# Patient Record
Sex: FEMALE | Race: WHITE | NOT HISPANIC OR LATINO | Employment: UNEMPLOYED | URBAN - METROPOLITAN AREA
[De-identification: names, ages, dates, MRNs, and addresses within clinical notes are randomized per-mention and may not be internally consistent; named-entity substitution may affect disease eponyms.]

---

## 2017-01-27 LAB
EXTERNAL ABO GROUPING: NORMAL
EXTERNAL ANTIBODY SCREEN: NEGATIVE
EXTERNAL CHLAMYDIA SCREEN: NORMAL
EXTERNAL GONORRHEA SCREEN: NORMAL
EXTERNAL HEPATITIS B SURFACE ANTIGEN: NEGATIVE
EXTERNAL RH FACTOR: POSITIVE
EXTERNAL RUBELLA QUALITATIVE: NORMAL
EXTERNAL SYPHILIS RPR SCREEN: NORMAL
HIV1 AB SPEC QL IA.RAPID: NEGATIVE

## 2017-06-16 ENCOUNTER — TRANSCRIBE ORDERS (OUTPATIENT)
Dept: OBSTETRICS AND GYNECOLOGY | Facility: HOSPITAL | Age: 22
End: 2017-06-16

## 2017-06-16 DIAGNOSIS — O36.5920: Primary | ICD-10-CM

## 2017-06-16 DIAGNOSIS — O30.002: Primary | ICD-10-CM

## 2017-06-16 LAB — EXTERNAL GTT 1 HOUR: 126

## 2017-06-22 ENCOUNTER — HOSPITAL ENCOUNTER (OUTPATIENT)
Dept: WOMENS IMAGING | Facility: HOSPITAL | Age: 22
Discharge: HOME | End: 2017-06-22
Admitting: OBSTETRICS & GYNECOLOGY

## 2017-06-22 ENCOUNTER — OFFICE VISIT (OUTPATIENT)
Dept: OBSTETRICS AND GYNECOLOGY | Facility: HOSPITAL | Age: 22
End: 2017-06-22

## 2017-06-22 VITALS
SYSTOLIC BLOOD PRESSURE: 122 MMHG | WEIGHT: 149.4 LBS | DIASTOLIC BLOOD PRESSURE: 76 MMHG | HEIGHT: 60 IN | BODY MASS INDEX: 29.33 KG/M2

## 2017-06-22 DIAGNOSIS — O30.002: ICD-10-CM

## 2017-06-22 DIAGNOSIS — O36.5920: ICD-10-CM

## 2017-06-22 DIAGNOSIS — O36.5932 IUGR (INTRAUTERINE GROWTH RESTRICTION) AFFECTING CARE OF MOTHER, THIRD TRIMESTER, FETUS 2: ICD-10-CM

## 2017-06-22 DIAGNOSIS — O30.039 TWIN MONOCHORIONIC DIAMNIOTIC PLACENTA, ANTEPARTUM: Primary | ICD-10-CM

## 2017-06-22 PROBLEM — O36.5990 IUGR (INTRAUTERINE GROWTH RESTRICTION) AFFECTING CARE OF MOTHER: Status: ACTIVE | Noted: 2017-06-22

## 2017-06-22 PROCEDURE — 76812 OB US DETAILED ADDL FETUS: CPT | Performed by: OBSTETRICS & GYNECOLOGY

## 2017-06-22 PROCEDURE — 76812 OB US DETAILED ADDL FETUS: CPT

## 2017-06-22 PROCEDURE — 99241 PR OFFICE CONSULTATION NEW/ESTAB PATIENT 15 MIN: CPT | Performed by: OBSTETRICS & GYNECOLOGY

## 2017-06-22 PROCEDURE — 76811 OB US DETAILED SNGL FETUS: CPT

## 2017-06-22 PROCEDURE — 76811 OB US DETAILED SNGL FETUS: CPT | Performed by: OBSTETRICS & GYNECOLOGY

## 2017-06-22 RX ORDER — PRENATAL WITH FERROUS FUM AND FOLIC ACID 3080; 920; 120; 400; 22; 1.84; 3; 20; 10; 1; 12; 200; 27; 25; 2 [IU]/1; [IU]/1; MG/1; [IU]/1; MG/1; MG/1; MG/1; MG/1; MG/1; MG/1; UG/1; MG/1; MG/1; MG/1; MG/1
1 TABLET ORAL DAILY
COMMUNITY

## 2017-06-22 NOTE — PROGRESS NOTES
Denies problems with pregnancy. States only one fetus noted by 6 week u/s, twin gestation identified during anatomy scan. States they are unsure about chorionicity. Discordant growth noted by u/s 6 days ago. Reports slightly decreased fetal movement since last pm; thinks it may be because she is nervous. Denies any bleeding, leaking fluid, cramping or contractions. Had not planned to know fetal genders.

## 2017-06-22 NOTE — PROGRESS NOTES
Documentation of the ultrasound findings, images, and interpretations as well as consultation notes will be available in the patient's ViewPoint report located in the chart review imaging tab in [place].

## 2017-06-29 ENCOUNTER — OFFICE VISIT (OUTPATIENT)
Dept: OBSTETRICS AND GYNECOLOGY | Facility: HOSPITAL | Age: 22
End: 2017-06-29

## 2017-06-29 ENCOUNTER — HOSPITAL ENCOUNTER (OUTPATIENT)
Dept: WOMENS IMAGING | Facility: HOSPITAL | Age: 22
Discharge: HOME | End: 2017-06-29
Attending: OBSTETRICS & GYNECOLOGY | Admitting: OBSTETRICS & GYNECOLOGY

## 2017-06-29 VITALS — WEIGHT: 151.2 LBS | SYSTOLIC BLOOD PRESSURE: 115 MMHG | DIASTOLIC BLOOD PRESSURE: 75 MMHG | BODY MASS INDEX: 30.03 KG/M2

## 2017-06-29 DIAGNOSIS — O36.5931 IUGR (INTRAUTERINE GROWTH RESTRICTION) AFFECTING CARE OF MOTHER, THIRD TRIMESTER, FETUS 1: Primary | ICD-10-CM

## 2017-06-29 DIAGNOSIS — O36.5932 IUGR (INTRAUTERINE GROWTH RESTRICTION) AFFECTING CARE OF MOTHER, THIRD TRIMESTER, FETUS 2: ICD-10-CM

## 2017-06-29 DIAGNOSIS — O30.039 TWIN MONOCHORIONIC DIAMNIOTIC PLACENTA, ANTEPARTUM: ICD-10-CM

## 2017-06-29 PROCEDURE — 76818 FETAL BIOPHYS PROFILE W/NST: CPT

## 2017-06-29 PROCEDURE — 76818 FETAL BIOPHYS PROFILE W/NST: CPT | Performed by: OBSTETRICS & GYNECOLOGY

## 2017-06-29 PROCEDURE — 76820 UMBILICAL ARTERY ECHO: CPT | Performed by: OBSTETRICS & GYNECOLOGY

## 2017-06-29 PROCEDURE — 76819 FETAL BIOPHYS PROFIL W/O NST: CPT

## 2017-06-29 NOTE — PROGRESS NOTES
Documentation of the ultrasound findings, images, and interpretations will be available in the patient's ViewPoint report located in the chart review imaging tab in [facility].

## 2017-07-06 ENCOUNTER — OFFICE VISIT (OUTPATIENT)
Dept: OBSTETRICS AND GYNECOLOGY | Facility: HOSPITAL | Age: 22
End: 2017-07-06

## 2017-07-06 ENCOUNTER — HOSPITAL ENCOUNTER (OUTPATIENT)
Dept: WOMENS IMAGING | Facility: HOSPITAL | Age: 22
Discharge: HOME | End: 2017-07-06
Attending: OBSTETRICS & GYNECOLOGY | Admitting: OBSTETRICS & GYNECOLOGY

## 2017-07-06 ENCOUNTER — HOSPITAL ENCOUNTER (INPATIENT)
Facility: HOSPITAL | Age: 22
LOS: 21 days | Discharge: HOME | End: 2017-07-27
Attending: OBSTETRICS & GYNECOLOGY | Admitting: OBSTETRICS & GYNECOLOGY

## 2017-07-06 VITALS — WEIGHT: 151.8 LBS | BODY MASS INDEX: 30.15 KG/M2 | SYSTOLIC BLOOD PRESSURE: 119 MMHG | DIASTOLIC BLOOD PRESSURE: 79 MMHG

## 2017-07-06 DIAGNOSIS — O36.5991 IUGR (INTRAUTERINE GROWTH RESTRICTION) AFFECTING CARE OF MOTHER, UNSPECIFIED TRIMESTER, FETUS 1: Primary | ICD-10-CM

## 2017-07-06 DIAGNOSIS — O30.039 TWIN MONOCHORIONIC DIAMNIOTIC PLACENTA, ANTEPARTUM: ICD-10-CM

## 2017-07-06 DIAGNOSIS — IMO0001 TWINS: ICD-10-CM

## 2017-07-06 DIAGNOSIS — O30.039 TWIN MONOCHORIONIC DIAMNIOTIC PLACENTA, ANTEPARTUM: Primary | ICD-10-CM

## 2017-07-06 DIAGNOSIS — O36.5931 IUGR (INTRAUTERINE GROWTH RESTRICTION) AFFECTING CARE OF MOTHER, THIRD TRIMESTER, FETUS 1: ICD-10-CM

## 2017-07-06 LAB
ABO GROUP BLD: NORMAL
ALP SERPL-CCNC: 381 U/L (ref 25–100)
ALT SERPL W P-5'-P-CCNC: 17 U/L (ref 7–40)
AST SERPL-CCNC: 25 U/L (ref 0–33)
BILIRUB SERPL-MCNC: 0.5 MG/DL (ref 0.3–1.2)
BLD GP AB SCN SERPL QL: NEGATIVE
CREAT BLD-MCNC: 0.4 MG/DL (ref 0.6–1.3)
DEPRECATED RDW RBC AUTO: 44.4 FL (ref 37–54)
DEPRECATED RDW RBC AUTO: 46.4 FL (ref 37–54)
ERYTHROCYTE [DISTWIDTH] IN BLOOD BY AUTOMATED COUNT: 13.5 % (ref 11.3–14.5)
ERYTHROCYTE [DISTWIDTH] IN BLOOD BY AUTOMATED COUNT: 13.6 % (ref 11.3–14.5)
HCT VFR BLD AUTO: 36.5 % (ref 34.5–44)
HCT VFR BLD AUTO: 37.4 % (ref 34.5–44)
HGB BLD-MCNC: 12 G/DL (ref 11.5–15.5)
HGB BLD-MCNC: 12.2 G/DL (ref 11.5–15.5)
LDH SERPL-CCNC: 251 U/L (ref 120–246)
MCH RBC QN AUTO: 29.8 PG (ref 27–31)
MCH RBC QN AUTO: 30.6 PG (ref 27–31)
MCHC RBC AUTO-ENTMCNC: 32.6 G/DL (ref 32–36)
MCHC RBC AUTO-ENTMCNC: 32.9 G/DL (ref 32–36)
MCV RBC AUTO: 91.4 FL (ref 80–99)
MCV RBC AUTO: 93.1 FL (ref 80–99)
PLATELET # BLD AUTO: 123 10*3/MM3 (ref 150–450)
PLATELET # BLD AUTO: 125 10*3/MM3 (ref 150–450)
PMV BLD AUTO: 11.6 FL (ref 6–12)
PMV BLD AUTO: 11.9 FL (ref 6–12)
RBC # BLD AUTO: 3.92 10*6/MM3 (ref 3.89–5.14)
RBC # BLD AUTO: 4.09 10*6/MM3 (ref 3.89–5.14)
RH BLD: POSITIVE
URATE SERPL-MCNC: 3.8 MG/DL (ref 3.1–7.8)
WBC NRBC COR # BLD: 11.59 10*3/MM3 (ref 3.5–10.8)
WBC NRBC COR # BLD: 11.85 10*3/MM3 (ref 3.5–10.8)

## 2017-07-06 PROCEDURE — 59025 FETAL NON-STRESS TEST: CPT

## 2017-07-06 PROCEDURE — 84075 ASSAY ALKALINE PHOSPHATASE: CPT | Performed by: OBSTETRICS & GYNECOLOGY

## 2017-07-06 PROCEDURE — 25010000002 BETAMETHASONE ACET & SOD PHOS PER 4 MG: Performed by: OBSTETRICS & GYNECOLOGY

## 2017-07-06 PROCEDURE — 82565 ASSAY OF CREATININE: CPT | Performed by: OBSTETRICS & GYNECOLOGY

## 2017-07-06 PROCEDURE — 86901 BLOOD TYPING SEROLOGIC RH(D): CPT | Performed by: OBSTETRICS & GYNECOLOGY

## 2017-07-06 PROCEDURE — 86900 BLOOD TYPING SEROLOGIC ABO: CPT | Performed by: OBSTETRICS & GYNECOLOGY

## 2017-07-06 PROCEDURE — 83615 LACTATE (LD) (LDH) ENZYME: CPT | Performed by: OBSTETRICS & GYNECOLOGY

## 2017-07-06 PROCEDURE — 86850 RBC ANTIBODY SCREEN: CPT | Performed by: OBSTETRICS & GYNECOLOGY

## 2017-07-06 PROCEDURE — 76819 FETAL BIOPHYS PROFIL W/O NST: CPT | Performed by: OBSTETRICS & GYNECOLOGY

## 2017-07-06 PROCEDURE — 99223 1ST HOSP IP/OBS HIGH 75: CPT | Performed by: OBSTETRICS & GYNECOLOGY

## 2017-07-06 PROCEDURE — 84450 TRANSFERASE (AST) (SGOT): CPT | Performed by: OBSTETRICS & GYNECOLOGY

## 2017-07-06 PROCEDURE — 84550 ASSAY OF BLOOD/URIC ACID: CPT | Performed by: OBSTETRICS & GYNECOLOGY

## 2017-07-06 PROCEDURE — 82247 BILIRUBIN TOTAL: CPT | Performed by: OBSTETRICS & GYNECOLOGY

## 2017-07-06 PROCEDURE — 84460 ALANINE AMINO (ALT) (SGPT): CPT | Performed by: OBSTETRICS & GYNECOLOGY

## 2017-07-06 PROCEDURE — 85027 COMPLETE CBC AUTOMATED: CPT | Performed by: OBSTETRICS & GYNECOLOGY

## 2017-07-06 PROCEDURE — 76820 UMBILICAL ARTERY ECHO: CPT | Performed by: OBSTETRICS & GYNECOLOGY

## 2017-07-06 RX ORDER — DEXTROSE AND SODIUM CHLORIDE 5; .2 G/100ML; G/100ML
125 INJECTION, SOLUTION INTRAVENOUS CONTINUOUS
Status: DISCONTINUED | OUTPATIENT
Start: 2017-07-06 | End: 2017-07-06

## 2017-07-06 RX ORDER — SODIUM CHLORIDE 0.9 % (FLUSH) 0.9 %
1-10 SYRINGE (ML) INJECTION AS NEEDED
Status: DISCONTINUED | OUTPATIENT
Start: 2017-07-06 | End: 2017-07-11

## 2017-07-06 RX ORDER — DEXTROSE AND SODIUM CHLORIDE 5; .2 G/100ML; G/100ML
100 INJECTION, SOLUTION INTRAVENOUS CONTINUOUS
Status: DISCONTINUED | OUTPATIENT
Start: 2017-07-06 | End: 2017-07-11

## 2017-07-06 RX ORDER — BETAMETHASONE SODIUM PHOSPHATE AND BETAMETHASONE ACETATE 3; 3 MG/ML; MG/ML
12 INJECTION, SUSPENSION INTRA-ARTICULAR; INTRALESIONAL; INTRAMUSCULAR; SOFT TISSUE EVERY 24 HOURS
Status: COMPLETED | OUTPATIENT
Start: 2017-07-06 | End: 2017-07-07

## 2017-07-06 RX ORDER — LIDOCAINE HYDROCHLORIDE 10 MG/ML
5 INJECTION, SOLUTION INFILTRATION; PERINEURAL AS NEEDED
Status: DISCONTINUED | OUTPATIENT
Start: 2017-07-06 | End: 2017-07-27 | Stop reason: HOSPADM

## 2017-07-06 RX ADMIN — BETAMETHASONE SODIUM PHOSPHATE AND BETAMETHASONE ACETATE 12 MG: 3; 3 INJECTION, SUSPENSION INTRA-ARTICULAR; INTRALESIONAL; INTRAMUSCULAR at 16:06

## 2017-07-06 RX ADMIN — DEXTROSE AND SODIUM CHLORIDE 100 ML/HR: 5; 200 INJECTION, SOLUTION INTRAVENOUS at 15:30

## 2017-07-06 NOTE — PROGRESS NOTES
Reports brief blurred vision 2 days ago. Denies any other symptoms or problems. To see Dr. Singer 7/11/2017.

## 2017-07-07 PROCEDURE — 59025 FETAL NON-STRESS TEST: CPT

## 2017-07-07 PROCEDURE — 99231 SBSQ HOSP IP/OBS SF/LOW 25: CPT | Performed by: OBSTETRICS & GYNECOLOGY

## 2017-07-07 PROCEDURE — 25010000002 BETAMETHASONE ACET & SOD PHOS PER 4 MG: Performed by: OBSTETRICS & GYNECOLOGY

## 2017-07-07 RX ADMIN — BETAMETHASONE SODIUM PHOSPHATE AND BETAMETHASONE ACETATE 12 MG: 3; 3 INJECTION, SUSPENSION INTRA-ARTICULAR; INTRALESIONAL; INTRAMUSCULAR at 16:36

## 2017-07-07 RX ADMIN — Medication 10 ML: at 23:04

## 2017-07-07 RX ADMIN — DEXTROSE AND SODIUM CHLORIDE 100 ML/HR: 5; 200 INJECTION, SOLUTION INTRAVENOUS at 09:39

## 2017-07-08 PROCEDURE — 99231 SBSQ HOSP IP/OBS SF/LOW 25: CPT | Performed by: OBSTETRICS & GYNECOLOGY

## 2017-07-08 PROCEDURE — 93976 VASCULAR STUDY: CPT

## 2017-07-08 PROCEDURE — 76820 UMBILICAL ARTERY ECHO: CPT

## 2017-07-09 PROCEDURE — 93976 VASCULAR STUDY: CPT

## 2017-07-09 PROCEDURE — 99231 SBSQ HOSP IP/OBS SF/LOW 25: CPT | Performed by: OBSTETRICS & GYNECOLOGY

## 2017-07-09 PROCEDURE — 59025 FETAL NON-STRESS TEST: CPT

## 2017-07-09 PROCEDURE — 76820 UMBILICAL ARTERY ECHO: CPT

## 2017-07-10 ENCOUNTER — APPOINTMENT (OUTPATIENT)
Dept: WOMENS IMAGING | Facility: HOSPITAL | Age: 22
End: 2017-07-10

## 2017-07-10 PROBLEM — IMO0001 TWINS: Status: RESOLVED | Noted: 2017-07-06 | Resolved: 2017-07-10

## 2017-07-10 PROCEDURE — 99231 SBSQ HOSP IP/OBS SF/LOW 25: CPT | Performed by: OBSTETRICS & GYNECOLOGY

## 2017-07-10 PROCEDURE — 59025 FETAL NON-STRESS TEST: CPT

## 2017-07-10 PROCEDURE — 93976 VASCULAR STUDY: CPT | Performed by: OBSTETRICS & GYNECOLOGY

## 2017-07-10 PROCEDURE — 93976 VASCULAR STUDY: CPT

## 2017-07-10 PROCEDURE — 76820 UMBILICAL ARTERY ECHO: CPT | Performed by: OBSTETRICS & GYNECOLOGY

## 2017-07-10 PROCEDURE — 76820 UMBILICAL ARTERY ECHO: CPT

## 2017-07-11 PROCEDURE — 99231 SBSQ HOSP IP/OBS SF/LOW 25: CPT | Performed by: OBSTETRICS & GYNECOLOGY

## 2017-07-11 PROCEDURE — 59025 FETAL NON-STRESS TEST: CPT

## 2017-07-11 RX ORDER — PRENATAL VIT/IRON FUM/FOLIC AC 27MG-0.8MG
1 TABLET ORAL DAILY
Status: DISCONTINUED | OUTPATIENT
Start: 2017-07-11 | End: 2017-07-24

## 2017-07-11 RX ADMIN — PRENATAL VIT W/ FE FUMARATE-FA TAB 27-0.8 MG 1 TABLET: 27-0.8 TAB at 14:00

## 2017-07-12 ENCOUNTER — APPOINTMENT (OUTPATIENT)
Dept: WOMENS IMAGING | Facility: HOSPITAL | Age: 22
End: 2017-07-12

## 2017-07-12 PROCEDURE — 99231 SBSQ HOSP IP/OBS SF/LOW 25: CPT | Performed by: OBSTETRICS & GYNECOLOGY

## 2017-07-12 PROCEDURE — 25010000002 MAGNESIUM SULFATE-LACT RINGERS 40 GM/580ML SOLUTION: Performed by: OBSTETRICS & GYNECOLOGY

## 2017-07-12 PROCEDURE — 93976 VASCULAR STUDY: CPT | Performed by: OBSTETRICS & GYNECOLOGY

## 2017-07-12 PROCEDURE — 59025 FETAL NON-STRESS TEST: CPT

## 2017-07-12 PROCEDURE — 25010000002 MAGNESIUM SULFATE-LACT RINGERS 40 GM/580ML SOLUTION

## 2017-07-12 PROCEDURE — 93976 VASCULAR STUDY: CPT

## 2017-07-12 PROCEDURE — 76820 UMBILICAL ARTERY ECHO: CPT | Performed by: OBSTETRICS & GYNECOLOGY

## 2017-07-12 PROCEDURE — 25010000002 DIPHENHYDRAMINE PER 50 MG

## 2017-07-12 PROCEDURE — 25010000002 PROMETHAZINE PER 50 MG: Performed by: OBSTETRICS & GYNECOLOGY

## 2017-07-12 RX ORDER — DIPHENHYDRAMINE HYDROCHLORIDE 50 MG/ML
INJECTION INTRAMUSCULAR; INTRAVENOUS
Status: COMPLETED
Start: 2017-07-12 | End: 2017-07-12

## 2017-07-12 RX ORDER — MAGNESIUM SULF/RINGERS LACTATE 40 G/500ML
PLASTIC BAG, INJECTION (ML) INTRAVENOUS
Status: COMPLETED
Start: 2017-07-12 | End: 2017-07-12

## 2017-07-12 RX ORDER — MAGNESIUM SULF/RINGERS LACTATE 40 G/500ML
1.5 PLASTIC BAG, INJECTION (ML) INTRAVENOUS CONTINUOUS
Status: DISCONTINUED | OUTPATIENT
Start: 2017-07-12 | End: 2017-07-13

## 2017-07-12 RX ORDER — PROMETHAZINE HYDROCHLORIDE 25 MG/ML
12.5 INJECTION, SOLUTION INTRAMUSCULAR; INTRAVENOUS EVERY 6 HOURS PRN
Status: DISCONTINUED | OUTPATIENT
Start: 2017-07-12 | End: 2017-07-12

## 2017-07-12 RX ORDER — DEXTROSE AND SODIUM CHLORIDE 5; .2 G/100ML; G/100ML
100 INJECTION, SOLUTION INTRAVENOUS CONTINUOUS
Status: DISCONTINUED | OUTPATIENT
Start: 2017-07-12 | End: 2017-07-13

## 2017-07-12 RX ORDER — ONDANSETRON 4 MG/1
4 TABLET, FILM COATED ORAL EVERY 6 HOURS PRN
Status: DISCONTINUED | OUTPATIENT
Start: 2017-07-12 | End: 2017-07-24

## 2017-07-12 RX ORDER — DIPHENHYDRAMINE HYDROCHLORIDE 50 MG/ML
25 INJECTION INTRAMUSCULAR; INTRAVENOUS ONCE
Status: COMPLETED | OUTPATIENT
Start: 2017-07-12 | End: 2017-07-12

## 2017-07-12 RX ORDER — MAGNESIUM SULF/RINGERS LACTATE 40 G/500ML
4 PLASTIC BAG, INJECTION (ML) INTRAVENOUS ONCE
Status: COMPLETED | OUTPATIENT
Start: 2017-07-12 | End: 2017-07-12

## 2017-07-12 RX ADMIN — DIPHENHYDRAMINE HYDROCHLORIDE 25 MG: 50 INJECTION INTRAMUSCULAR; INTRAVENOUS at 22:46

## 2017-07-12 RX ADMIN — Medication 4 G: at 19:07

## 2017-07-12 RX ADMIN — PROMETHAZINE HYDROCHLORIDE 12.5 MG: 25 INJECTION INTRAMUSCULAR; INTRAVENOUS at 21:52

## 2017-07-12 RX ADMIN — Medication 2 G/HR: at 19:37

## 2017-07-12 RX ADMIN — ONDANSETRON 4 MG: 4 TABLET, FILM COATED ORAL at 17:28

## 2017-07-12 RX ADMIN — PRENATAL VIT W/ FE FUMARATE-FA TAB 27-0.8 MG 1 TABLET: 27-0.8 TAB at 09:10

## 2017-07-12 RX ADMIN — DEXTROSE AND SODIUM CHLORIDE 100 ML/HR: 5; 200 INJECTION, SOLUTION INTRAVENOUS at 19:05

## 2017-07-13 PROCEDURE — 99231 SBSQ HOSP IP/OBS SF/LOW 25: CPT | Performed by: OBSTETRICS & GYNECOLOGY

## 2017-07-13 PROCEDURE — 93976 VASCULAR STUDY: CPT

## 2017-07-13 PROCEDURE — 25010000002 ONDANSETRON PER 1 MG: Performed by: OBSTETRICS & GYNECOLOGY

## 2017-07-13 PROCEDURE — 59025 FETAL NON-STRESS TEST: CPT

## 2017-07-13 PROCEDURE — 76820 UMBILICAL ARTERY ECHO: CPT

## 2017-07-13 RX ORDER — ONDANSETRON 2 MG/ML
4 INJECTION INTRAMUSCULAR; INTRAVENOUS EVERY 6 HOURS PRN
Status: DISCONTINUED | OUTPATIENT
Start: 2017-07-13 | End: 2017-07-14

## 2017-07-13 RX ORDER — SODIUM CHLORIDE, SODIUM LACTATE, POTASSIUM CHLORIDE, CALCIUM CHLORIDE 600; 310; 30; 20 MG/100ML; MG/100ML; MG/100ML; MG/100ML
125 INJECTION, SOLUTION INTRAVENOUS CONTINUOUS
Status: DISCONTINUED | OUTPATIENT
Start: 2017-07-13 | End: 2017-07-14

## 2017-07-13 RX ADMIN — ONDANSETRON 4 MG: 2 INJECTION INTRAMUSCULAR; INTRAVENOUS at 01:02

## 2017-07-13 RX ADMIN — PRENATAL VIT W/ FE FUMARATE-FA TAB 27-0.8 MG 1 TABLET: 27-0.8 TAB at 09:14

## 2017-07-13 RX ADMIN — SODIUM CHLORIDE, POTASSIUM CHLORIDE, SODIUM LACTATE AND CALCIUM CHLORIDE 125 ML/HR: 600; 310; 30; 20 INJECTION, SOLUTION INTRAVENOUS at 10:30

## 2017-07-13 RX ADMIN — SODIUM CHLORIDE, POTASSIUM CHLORIDE, SODIUM LACTATE AND CALCIUM CHLORIDE 125 ML/HR: 600; 310; 30; 20 INJECTION, SOLUTION INTRAVENOUS at 17:06

## 2017-07-14 ENCOUNTER — APPOINTMENT (OUTPATIENT)
Dept: WOMENS IMAGING | Facility: HOSPITAL | Age: 22
End: 2017-07-14

## 2017-07-14 LAB
ALP SERPL-CCNC: 420 U/L (ref 25–100)
ALT SERPL W P-5'-P-CCNC: 28 U/L (ref 7–40)
AST SERPL-CCNC: 32 U/L (ref 0–33)
BILIRUB SERPL-MCNC: 0.5 MG/DL (ref 0.3–1.2)
CREAT BLD-MCNC: 0.5 MG/DL (ref 0.6–1.3)
DEPRECATED RDW RBC AUTO: 48.1 FL (ref 37–54)
ERYTHROCYTE [DISTWIDTH] IN BLOOD BY AUTOMATED COUNT: 13.8 % (ref 11.3–14.5)
HCT VFR BLD AUTO: 34.5 % (ref 34.5–44)
HGB BLD-MCNC: 11.1 G/DL (ref 11.5–15.5)
LDH SERPL-CCNC: 232 U/L (ref 120–246)
MCH RBC QN AUTO: 30.4 PG (ref 27–31)
MCHC RBC AUTO-ENTMCNC: 32.2 G/DL (ref 32–36)
MCV RBC AUTO: 94.5 FL (ref 80–99)
PLATELET # BLD AUTO: 139 10*3/MM3 (ref 150–450)
PMV BLD AUTO: 11.6 FL (ref 6–12)
RBC # BLD AUTO: 3.65 10*6/MM3 (ref 3.89–5.14)
URATE SERPL-MCNC: 4.6 MG/DL (ref 3.1–7.8)
WBC NRBC COR # BLD: 9.39 10*3/MM3 (ref 3.5–10.8)

## 2017-07-14 PROCEDURE — 84460 ALANINE AMINO (ALT) (SGPT): CPT | Performed by: OBSTETRICS & GYNECOLOGY

## 2017-07-14 PROCEDURE — 84075 ASSAY ALKALINE PHOSPHATASE: CPT | Performed by: OBSTETRICS & GYNECOLOGY

## 2017-07-14 PROCEDURE — 82247 BILIRUBIN TOTAL: CPT | Performed by: OBSTETRICS & GYNECOLOGY

## 2017-07-14 PROCEDURE — 76819 FETAL BIOPHYS PROFIL W/O NST: CPT | Performed by: OBSTETRICS & GYNECOLOGY

## 2017-07-14 PROCEDURE — 83615 LACTATE (LD) (LDH) ENZYME: CPT | Performed by: OBSTETRICS & GYNECOLOGY

## 2017-07-14 PROCEDURE — 84550 ASSAY OF BLOOD/URIC ACID: CPT | Performed by: OBSTETRICS & GYNECOLOGY

## 2017-07-14 PROCEDURE — 76820 UMBILICAL ARTERY ECHO: CPT | Performed by: OBSTETRICS & GYNECOLOGY

## 2017-07-14 PROCEDURE — 82565 ASSAY OF CREATININE: CPT | Performed by: OBSTETRICS & GYNECOLOGY

## 2017-07-14 PROCEDURE — 99231 SBSQ HOSP IP/OBS SF/LOW 25: CPT | Performed by: OBSTETRICS & GYNECOLOGY

## 2017-07-14 PROCEDURE — 84450 TRANSFERASE (AST) (SGOT): CPT | Performed by: OBSTETRICS & GYNECOLOGY

## 2017-07-14 PROCEDURE — 76820 UMBILICAL ARTERY ECHO: CPT

## 2017-07-14 PROCEDURE — 76819 FETAL BIOPHYS PROFIL W/O NST: CPT

## 2017-07-14 PROCEDURE — 85027 COMPLETE CBC AUTOMATED: CPT | Performed by: OBSTETRICS & GYNECOLOGY

## 2017-07-14 PROCEDURE — 59025 FETAL NON-STRESS TEST: CPT

## 2017-07-14 RX ORDER — ACETAMINOPHEN 325 MG/1
650 TABLET ORAL EVERY 6 HOURS PRN
Status: DISCONTINUED | OUTPATIENT
Start: 2017-07-14 | End: 2017-07-27 | Stop reason: HOSPADM

## 2017-07-14 RX ADMIN — PRENATAL VIT W/ FE FUMARATE-FA TAB 27-0.8 MG 1 TABLET: 27-0.8 TAB at 09:03

## 2017-07-14 RX ADMIN — SODIUM CHLORIDE, POTASSIUM CHLORIDE, SODIUM LACTATE AND CALCIUM CHLORIDE 125 ML/HR: 600; 310; 30; 20 INJECTION, SOLUTION INTRAVENOUS at 02:01

## 2017-07-14 RX ADMIN — ACETAMINOPHEN 650 MG: 325 TABLET, FILM COATED ORAL at 06:34

## 2017-07-15 PROCEDURE — 59025 FETAL NON-STRESS TEST: CPT

## 2017-07-15 PROCEDURE — 99231 SBSQ HOSP IP/OBS SF/LOW 25: CPT | Performed by: OBSTETRICS & GYNECOLOGY

## 2017-07-15 PROCEDURE — 63710000001 DIPHENHYDRAMINE PER 50 MG: Performed by: OBSTETRICS & GYNECOLOGY

## 2017-07-15 RX ORDER — DIPHENHYDRAMINE HCL 25 MG
25 CAPSULE ORAL EVERY 6 HOURS PRN
Status: DISCONTINUED | OUTPATIENT
Start: 2017-07-15 | End: 2017-07-24 | Stop reason: SDUPTHER

## 2017-07-15 RX ADMIN — PRENATAL VIT W/ FE FUMARATE-FA TAB 27-0.8 MG 1 TABLET: 27-0.8 TAB at 08:40

## 2017-07-15 RX ADMIN — DIPHENHYDRAMINE HYDROCHLORIDE 25 MG: 25 CAPSULE ORAL at 12:29

## 2017-07-16 PROCEDURE — 59025 FETAL NON-STRESS TEST: CPT

## 2017-07-16 PROCEDURE — 99231 SBSQ HOSP IP/OBS SF/LOW 25: CPT | Performed by: OBSTETRICS & GYNECOLOGY

## 2017-07-16 RX ADMIN — PRENATAL VIT W/ FE FUMARATE-FA TAB 27-0.8 MG 1 TABLET: 27-0.8 TAB at 08:02

## 2017-07-17 ENCOUNTER — APPOINTMENT (OUTPATIENT)
Dept: WOMENS IMAGING | Facility: HOSPITAL | Age: 22
End: 2017-07-17

## 2017-07-17 PROCEDURE — 76816 OB US FOLLOW-UP PER FETUS: CPT

## 2017-07-17 PROCEDURE — 59025 FETAL NON-STRESS TEST: CPT

## 2017-07-17 PROCEDURE — 76820 UMBILICAL ARTERY ECHO: CPT | Performed by: OBSTETRICS & GYNECOLOGY

## 2017-07-17 PROCEDURE — 93976 VASCULAR STUDY: CPT | Performed by: OBSTETRICS & GYNECOLOGY

## 2017-07-17 PROCEDURE — 76816 OB US FOLLOW-UP PER FETUS: CPT | Performed by: OBSTETRICS & GYNECOLOGY

## 2017-07-17 PROCEDURE — 76820 UMBILICAL ARTERY ECHO: CPT

## 2017-07-17 PROCEDURE — 99231 SBSQ HOSP IP/OBS SF/LOW 25: CPT | Performed by: OBSTETRICS & GYNECOLOGY

## 2017-07-17 PROCEDURE — 93976 VASCULAR STUDY: CPT

## 2017-07-17 RX ADMIN — PRENATAL VIT W/ FE FUMARATE-FA TAB 27-0.8 MG 1 TABLET: 27-0.8 TAB at 09:51

## 2017-07-18 PROCEDURE — 99231 SBSQ HOSP IP/OBS SF/LOW 25: CPT | Performed by: OBSTETRICS & GYNECOLOGY

## 2017-07-18 PROCEDURE — 59025 FETAL NON-STRESS TEST: CPT

## 2017-07-18 RX ADMIN — PRENATAL VIT W/ FE FUMARATE-FA TAB 27-0.8 MG 1 TABLET: 27-0.8 TAB at 08:33

## 2017-07-18 RX ADMIN — ACETAMINOPHEN 650 MG: 325 TABLET, FILM COATED ORAL at 19:33

## 2017-07-19 PROCEDURE — 76819 FETAL BIOPHYS PROFIL W/O NST: CPT

## 2017-07-19 PROCEDURE — 76820 UMBILICAL ARTERY ECHO: CPT

## 2017-07-19 PROCEDURE — 59025 FETAL NON-STRESS TEST: CPT

## 2017-07-19 PROCEDURE — 99231 SBSQ HOSP IP/OBS SF/LOW 25: CPT | Performed by: OBSTETRICS & GYNECOLOGY

## 2017-07-19 RX ADMIN — PRENATAL VIT W/ FE FUMARATE-FA TAB 27-0.8 MG 1 TABLET: 27-0.8 TAB at 09:25

## 2017-07-20 PROCEDURE — 99231 SBSQ HOSP IP/OBS SF/LOW 25: CPT | Performed by: OBSTETRICS & GYNECOLOGY

## 2017-07-20 RX ADMIN — PRENATAL VIT W/ FE FUMARATE-FA TAB 27-0.8 MG 1 TABLET: 27-0.8 TAB at 08:12

## 2017-07-21 ENCOUNTER — APPOINTMENT (OUTPATIENT)
Dept: WOMENS IMAGING | Facility: HOSPITAL | Age: 22
End: 2017-07-21

## 2017-07-21 PROCEDURE — 76820 UMBILICAL ARTERY ECHO: CPT | Performed by: OBSTETRICS & GYNECOLOGY

## 2017-07-21 PROCEDURE — 59025 FETAL NON-STRESS TEST: CPT

## 2017-07-21 PROCEDURE — 93976 VASCULAR STUDY: CPT | Performed by: OBSTETRICS & GYNECOLOGY

## 2017-07-21 PROCEDURE — 93976 VASCULAR STUDY: CPT

## 2017-07-21 PROCEDURE — 99231 SBSQ HOSP IP/OBS SF/LOW 25: CPT | Performed by: OBSTETRICS & GYNECOLOGY

## 2017-07-21 PROCEDURE — 25010000002 BETAMETHASONE ACET & SOD PHOS PER 4 MG: Performed by: OBSTETRICS & GYNECOLOGY

## 2017-07-21 RX ORDER — BETAMETHASONE SODIUM PHOSPHATE AND BETAMETHASONE ACETATE 3; 3 MG/ML; MG/ML
12 INJECTION, SUSPENSION INTRA-ARTICULAR; INTRALESIONAL; INTRAMUSCULAR; SOFT TISSUE EVERY 24 HOURS
Status: COMPLETED | OUTPATIENT
Start: 2017-07-21 | End: 2017-07-22

## 2017-07-21 RX ADMIN — BETAMETHASONE SODIUM PHOSPHATE AND BETAMETHASONE ACETATE 12 MG: 3; 3 INJECTION, SUSPENSION INTRA-ARTICULAR; INTRALESIONAL; INTRAMUSCULAR at 09:34

## 2017-07-21 RX ADMIN — PRENATAL VIT W/ FE FUMARATE-FA TAB 27-0.8 MG 1 TABLET: 27-0.8 TAB at 09:34

## 2017-07-22 PROCEDURE — 59025 FETAL NON-STRESS TEST: CPT

## 2017-07-22 PROCEDURE — 99231 SBSQ HOSP IP/OBS SF/LOW 25: CPT | Performed by: OBSTETRICS & GYNECOLOGY

## 2017-07-22 PROCEDURE — 25010000002 BETAMETHASONE ACET & SOD PHOS PER 4 MG: Performed by: OBSTETRICS & GYNECOLOGY

## 2017-07-22 RX ADMIN — PRENATAL VIT W/ FE FUMARATE-FA TAB 27-0.8 MG 1 TABLET: 27-0.8 TAB at 08:44

## 2017-07-22 RX ADMIN — BETAMETHASONE SODIUM PHOSPHATE AND BETAMETHASONE ACETATE 12 MG: 3; 3 INJECTION, SUSPENSION INTRA-ARTICULAR; INTRALESIONAL; INTRAMUSCULAR at 08:45

## 2017-07-23 LAB
ABO GROUP BLD: NORMAL
ALP SERPL-CCNC: 430 U/L (ref 25–100)
ALT SERPL W P-5'-P-CCNC: 62 U/L (ref 7–40)
AST SERPL-CCNC: 45 U/L (ref 0–33)
BILIRUB SERPL-MCNC: 0.5 MG/DL (ref 0.3–1.2)
BLD GP AB SCN SERPL QL: NEGATIVE
CREAT BLD-MCNC: 0.5 MG/DL (ref 0.6–1.3)
DEPRECATED RDW RBC AUTO: 45.9 FL (ref 37–54)
ERYTHROCYTE [DISTWIDTH] IN BLOOD BY AUTOMATED COUNT: 13.7 % (ref 11.3–14.5)
HCT VFR BLD AUTO: 34.3 % (ref 34.5–44)
HGB BLD-MCNC: 11.1 G/DL (ref 11.5–15.5)
LDH SERPL-CCNC: 212 U/L (ref 120–246)
MCH RBC QN AUTO: 30 PG (ref 27–31)
MCHC RBC AUTO-ENTMCNC: 32.4 G/DL (ref 32–36)
MCV RBC AUTO: 92.7 FL (ref 80–99)
PLATELET # BLD AUTO: 144 10*3/MM3 (ref 150–450)
PMV BLD AUTO: 12.3 FL (ref 6–12)
RBC # BLD AUTO: 3.7 10*6/MM3 (ref 3.89–5.14)
RH BLD: POSITIVE
URATE SERPL-MCNC: 4.8 MG/DL (ref 3.1–7.8)
WBC NRBC COR # BLD: 14.37 10*3/MM3 (ref 3.5–10.8)

## 2017-07-23 PROCEDURE — 85027 COMPLETE CBC AUTOMATED: CPT | Performed by: OBSTETRICS & GYNECOLOGY

## 2017-07-23 PROCEDURE — 83615 LACTATE (LD) (LDH) ENZYME: CPT | Performed by: OBSTETRICS & GYNECOLOGY

## 2017-07-23 PROCEDURE — S0260 H&P FOR SURGERY: HCPCS | Performed by: OBSTETRICS & GYNECOLOGY

## 2017-07-23 PROCEDURE — 86900 BLOOD TYPING SEROLOGIC ABO: CPT | Performed by: OBSTETRICS & GYNECOLOGY

## 2017-07-23 PROCEDURE — 82565 ASSAY OF CREATININE: CPT | Performed by: OBSTETRICS & GYNECOLOGY

## 2017-07-23 PROCEDURE — 84075 ASSAY ALKALINE PHOSPHATASE: CPT | Performed by: OBSTETRICS & GYNECOLOGY

## 2017-07-23 PROCEDURE — 82247 BILIRUBIN TOTAL: CPT | Performed by: OBSTETRICS & GYNECOLOGY

## 2017-07-23 PROCEDURE — 84460 ALANINE AMINO (ALT) (SGPT): CPT | Performed by: OBSTETRICS & GYNECOLOGY

## 2017-07-23 PROCEDURE — 84450 TRANSFERASE (AST) (SGOT): CPT | Performed by: OBSTETRICS & GYNECOLOGY

## 2017-07-23 PROCEDURE — 86850 RBC ANTIBODY SCREEN: CPT | Performed by: OBSTETRICS & GYNECOLOGY

## 2017-07-23 PROCEDURE — 86901 BLOOD TYPING SEROLOGIC RH(D): CPT | Performed by: OBSTETRICS & GYNECOLOGY

## 2017-07-23 PROCEDURE — 59025 FETAL NON-STRESS TEST: CPT

## 2017-07-23 PROCEDURE — 84550 ASSAY OF BLOOD/URIC ACID: CPT | Performed by: OBSTETRICS & GYNECOLOGY

## 2017-07-23 RX ADMIN — PRENATAL VIT W/ FE FUMARATE-FA TAB 27-0.8 MG 1 TABLET: 27-0.8 TAB at 08:49

## 2017-07-24 ENCOUNTER — ANESTHESIA EVENT (OUTPATIENT)
Dept: LABOR AND DELIVERY | Facility: HOSPITAL | Age: 22
End: 2017-07-24

## 2017-07-24 ENCOUNTER — ANESTHESIA (OUTPATIENT)
Dept: LABOR AND DELIVERY | Facility: HOSPITAL | Age: 22
End: 2017-07-24

## 2017-07-24 VITALS
DIASTOLIC BLOOD PRESSURE: 67 MMHG | SYSTOLIC BLOOD PRESSURE: 119 MMHG | HEART RATE: 70 BPM | OXYGEN SATURATION: 99 % | RESPIRATION RATE: 1 BRPM

## 2017-07-24 PROBLEM — O30.039 TWIN MONOCHORIONIC DIAMNIOTIC PLACENTA, ANTEPARTUM: Status: RESOLVED | Noted: 2017-06-22 | Resolved: 2017-07-24

## 2017-07-24 LAB
ATMOSPHERIC PRESS: ABNORMAL MMHG
BASE EXCESS BLDCOA CALC-SCNC: -3.6 MMOL/L (ref 0–2)
BASE EXCESS BLDCOA CALC-SCNC: -5.4 MMOL/L (ref 0–2)
BASE EXCESS BLDCOV CALC-SCNC: -3.3 MMOL/L (ref 0–2)
BASE EXCESS BLDCOV CALC-SCNC: -3.4 MMOL/L (ref 0–2)
BDY SITE: ABNORMAL
BDY SITE: ABNORMAL
CO2 BLDA-SCNC: 22.1 MMOL/L (ref 22–33)
CO2 BLDA-SCNC: 23.4 MMOL/L (ref 22–33)
CO2 BLDA-SCNC: 23.7 MMOL/L (ref 22–33)
CO2 BLDA-SCNC: 24.1 MMOL/L (ref 22–33)
DEPRECATED RDW RBC AUTO: 45.3 FL (ref 37–54)
ERYTHROCYTE [DISTWIDTH] IN BLOOD BY AUTOMATED COUNT: 13.8 % (ref 11.3–14.5)
HCO3 BLDCOA-SCNC: 20.8 MMOL/L (ref 16.9–20.5)
HCO3 BLDCOA-SCNC: 22.1 MMOL/L (ref 16.9–20.5)
HCO3 BLDCOV-SCNC: 22.4 MMOL/L (ref 18.6–21.4)
HCO3 BLDCOV-SCNC: 22.8 MMOL/L (ref 18.6–21.4)
HCT VFR BLD AUTO: 32.3 % (ref 34.5–44)
HGB BLD-MCNC: 10.4 G/DL (ref 11.5–15.5)
HGB BLDA-MCNC: 15.6 G/DL (ref 14–18)
HGB BLDA-MCNC: 15.7 G/DL (ref 14–18)
HGB BLDA-MCNC: 17 G/DL (ref 14–18)
HGB BLDA-MCNC: 17.3 G/DL (ref 14–18)
HOROWITZ INDEX BLD+IHG-RTO: 21 %
MCH RBC QN AUTO: 29.2 PG (ref 27–31)
MCHC RBC AUTO-ENTMCNC: 32.2 G/DL (ref 32–36)
MCV RBC AUTO: 90.7 FL (ref 80–99)
MODALITY: ABNORMAL
PCO2 BLDCOA: 41.9 MMHG (ref 43.3–54.9)
PCO2 BLDCOA: 42.6 MMHG (ref 43.3–54.9)
PCO2 BLDCOV: 42.4 MM HG (ref 30–60)
PCO2 BLDCOV: 44.2 MM HG (ref 30–60)
PH BLDCOA: 7.3 [PH] (ref 7.2–7.3)
PH BLDCOA: 7.33 [PH] (ref 7.2–7.3)
PH BLDCOV: 7.32 [PH] (ref 7.19–7.46)
PH BLDCOV: 7.33 [PH] (ref 7.19–7.46)
PLATELET # BLD AUTO: 148 10*3/MM3 (ref 150–450)
PMV BLD AUTO: 12.9 FL (ref 6–12)
PO2 BLDCOA: 24.4 MMHG (ref 11.5–43.3)
PO2 BLDCOA: 28.9 MMHG (ref 11.5–43.3)
PO2 BLDCOV: 22.4 MM HG
PO2 BLDCOV: 23.5 MM HG
RBC # BLD AUTO: 3.56 10*6/MM3 (ref 3.89–5.14)
SAO2 % BLDCOA: 52.4 %
SAO2 % BLDCOA: 54.2 %
SAO2 % BLDCOA: ABNORMAL % (ref 45–75)
SAO2 % BLDCOA: ABNORMAL % (ref 45–75)
SAO2 % BLDCOV: 43.2 %
SAO2 % BLDCOV: 53.2 %
WBC NRBC COR # BLD: 10.64 10*3/MM3 (ref 3.5–10.8)

## 2017-07-24 PROCEDURE — 25010000002 MIDAZOLAM PER 1 MG: Performed by: NURSE ANESTHETIST, CERTIFIED REGISTERED

## 2017-07-24 PROCEDURE — 59514 CESAREAN DELIVERY ONLY: CPT | Performed by: OBSTETRICS & GYNECOLOGY

## 2017-07-24 PROCEDURE — 85027 COMPLETE CBC AUTOMATED: CPT | Performed by: OBSTETRICS & GYNECOLOGY

## 2017-07-24 PROCEDURE — 25010000002 ONDANSETRON PER 1 MG: Performed by: OBSTETRICS & GYNECOLOGY

## 2017-07-24 PROCEDURE — 25010000002 ONDANSETRON PER 1 MG: Performed by: NURSE ANESTHETIST, CERTIFIED REGISTERED

## 2017-07-24 PROCEDURE — 51702 INSERT TEMP BLADDER CATH: CPT

## 2017-07-24 PROCEDURE — 25010000002 PHENYLEPHRINE PER 1 ML: Performed by: NURSE ANESTHETIST, CERTIFIED REGISTERED

## 2017-07-24 PROCEDURE — 88307 TISSUE EXAM BY PATHOLOGIST: CPT | Performed by: OBSTETRICS & GYNECOLOGY

## 2017-07-24 PROCEDURE — 59025 FETAL NON-STRESS TEST: CPT

## 2017-07-24 PROCEDURE — 51703 INSERT BLADDER CATH COMPLEX: CPT

## 2017-07-24 PROCEDURE — 82805 BLOOD GASES W/O2 SATURATION: CPT | Performed by: OBSTETRICS & GYNECOLOGY

## 2017-07-24 PROCEDURE — 25010000002 FENTANYL CITRATE (PF) 100 MCG/2ML SOLUTION: Performed by: NURSE ANESTHETIST, CERTIFIED REGISTERED

## 2017-07-24 RX ORDER — LIDOCAINE HYDROCHLORIDE 10 MG/ML
5 INJECTION, SOLUTION INFILTRATION; PERINEURAL AS NEEDED
Status: DISCONTINUED | OUTPATIENT
Start: 2017-07-24 | End: 2017-07-24

## 2017-07-24 RX ORDER — MIDAZOLAM HYDROCHLORIDE 1 MG/ML
INJECTION INTRAMUSCULAR; INTRAVENOUS AS NEEDED
Status: DISCONTINUED | OUTPATIENT
Start: 2017-07-24 | End: 2017-07-24 | Stop reason: SURG

## 2017-07-24 RX ORDER — ACETAMINOPHEN 325 MG/1
650 TABLET ORAL ONCE AS NEEDED
Status: DISCONTINUED | OUTPATIENT
Start: 2017-07-24 | End: 2017-07-24 | Stop reason: SDUPTHER

## 2017-07-24 RX ORDER — SIMETHICONE 80 MG
80 TABLET,CHEWABLE ORAL 4 TIMES DAILY PRN
Status: DISCONTINUED | OUTPATIENT
Start: 2017-07-24 | End: 2017-07-27 | Stop reason: HOSPADM

## 2017-07-24 RX ORDER — HYDROMORPHONE HYDROCHLORIDE 1 MG/ML
0.5 INJECTION, SOLUTION INTRAMUSCULAR; INTRAVENOUS; SUBCUTANEOUS
Status: ACTIVE | OUTPATIENT
Start: 2017-07-24 | End: 2017-07-25

## 2017-07-24 RX ORDER — ONDANSETRON 2 MG/ML
INJECTION INTRAMUSCULAR; INTRAVENOUS AS NEEDED
Status: DISCONTINUED | OUTPATIENT
Start: 2017-07-24 | End: 2017-07-24 | Stop reason: SURG

## 2017-07-24 RX ORDER — MORPHINE SULFATE 4 MG/ML
2 INJECTION, SOLUTION INTRAMUSCULAR; INTRAVENOUS
Status: ACTIVE | OUTPATIENT
Start: 2017-07-24 | End: 2017-07-25

## 2017-07-24 RX ORDER — DIPHENHYDRAMINE HCL 25 MG
25 CAPSULE ORAL EVERY 4 HOURS PRN
Status: DISCONTINUED | OUTPATIENT
Start: 2017-07-24 | End: 2017-07-27 | Stop reason: HOSPADM

## 2017-07-24 RX ORDER — OXYCODONE HYDROCHLORIDE AND ACETAMINOPHEN 5; 325 MG/1; MG/1
1 TABLET ORAL EVERY 4 HOURS PRN
Status: DISCONTINUED | OUTPATIENT
Start: 2017-07-25 | End: 2017-07-27 | Stop reason: HOSPADM

## 2017-07-24 RX ORDER — ONDANSETRON 2 MG/ML
4 INJECTION INTRAMUSCULAR; INTRAVENOUS ONCE
Status: DISCONTINUED | OUTPATIENT
Start: 2017-07-24 | End: 2017-07-27 | Stop reason: HOSPADM

## 2017-07-24 RX ORDER — DOCUSATE SODIUM 100 MG/1
100 CAPSULE, LIQUID FILLED ORAL 2 TIMES DAILY PRN
Status: DISCONTINUED | OUTPATIENT
Start: 2017-07-24 | End: 2017-07-27 | Stop reason: HOSPADM

## 2017-07-24 RX ORDER — OXYCODONE HYDROCHLORIDE AND ACETAMINOPHEN 5; 325 MG/1; MG/1
2 TABLET ORAL ONCE AS NEEDED
Status: COMPLETED | OUTPATIENT
Start: 2017-07-24 | End: 2017-07-24

## 2017-07-24 RX ORDER — METOCLOPRAMIDE HYDROCHLORIDE 5 MG/ML
10 INJECTION INTRAMUSCULAR; INTRAVENOUS ONCE AS NEEDED
Status: DISCONTINUED | OUTPATIENT
Start: 2017-07-24 | End: 2017-07-27 | Stop reason: HOSPADM

## 2017-07-24 RX ORDER — DEXTROSE AND SODIUM CHLORIDE 5; .2 G/100ML; G/100ML
125 INJECTION, SOLUTION INTRAVENOUS CONTINUOUS
Status: DISCONTINUED | OUTPATIENT
Start: 2017-07-24 | End: 2017-07-27 | Stop reason: HOSPADM

## 2017-07-24 RX ORDER — OXYTOCIN 10 [USP'U]/ML
INJECTION, SOLUTION INTRAMUSCULAR; INTRAVENOUS AS NEEDED
Status: DISCONTINUED | OUTPATIENT
Start: 2017-07-24 | End: 2017-07-24 | Stop reason: SURG

## 2017-07-24 RX ORDER — DIPHENHYDRAMINE HYDROCHLORIDE 50 MG/ML
25 INJECTION INTRAMUSCULAR; INTRAVENOUS EVERY 4 HOURS PRN
Status: DISCONTINUED | OUTPATIENT
Start: 2017-07-24 | End: 2017-07-27 | Stop reason: HOSPADM

## 2017-07-24 RX ORDER — MAGNESIUM SULFATE HEPTAHYDRATE 40 MG/ML
2 INJECTION, SOLUTION INTRAVENOUS
Status: DISCONTINUED | OUTPATIENT
Start: 2017-07-24 | End: 2017-07-24

## 2017-07-24 RX ORDER — SODIUM CHLORIDE, SODIUM LACTATE, POTASSIUM CHLORIDE, CALCIUM CHLORIDE 600; 310; 30; 20 MG/100ML; MG/100ML; MG/100ML; MG/100ML
125 INJECTION, SOLUTION INTRAVENOUS CONTINUOUS
Status: DISCONTINUED | OUTPATIENT
Start: 2017-07-24 | End: 2017-07-24 | Stop reason: SDUPTHER

## 2017-07-24 RX ORDER — NALOXONE HCL 0.4 MG/ML
0.4 VIAL (ML) INJECTION
Status: ACTIVE | OUTPATIENT
Start: 2017-07-24 | End: 2017-07-25

## 2017-07-24 RX ORDER — MAGNESIUM SULF/RINGERS LACTATE 40 G/500ML
2 PLASTIC BAG, INJECTION (ML) INTRAVENOUS CONTINUOUS
Status: DISCONTINUED | OUTPATIENT
Start: 2017-07-24 | End: 2017-07-24

## 2017-07-24 RX ORDER — ONDANSETRON 2 MG/ML
4 INJECTION INTRAMUSCULAR; INTRAVENOUS ONCE
Status: COMPLETED | OUTPATIENT
Start: 2017-07-24 | End: 2017-07-24

## 2017-07-24 RX ORDER — SODIUM CHLORIDE 0.9 % (FLUSH) 0.9 %
1-10 SYRINGE (ML) INJECTION AS NEEDED
Status: DISCONTINUED | OUTPATIENT
Start: 2017-07-24 | End: 2017-07-24

## 2017-07-24 RX ORDER — FENTANYL CITRATE 50 UG/ML
INJECTION, SOLUTION INTRAMUSCULAR; INTRAVENOUS AS NEEDED
Status: DISCONTINUED | OUTPATIENT
Start: 2017-07-24 | End: 2017-07-24 | Stop reason: SURG

## 2017-07-24 RX ORDER — OXYCODONE AND ACETAMINOPHEN 7.5; 325 MG/1; MG/1
1 TABLET ORAL EVERY 4 HOURS PRN
Status: DISCONTINUED | OUTPATIENT
Start: 2017-07-25 | End: 2017-07-27 | Stop reason: HOSPADM

## 2017-07-24 RX ADMIN — OXYCODONE AND ACETAMINOPHEN 2 TABLET: 5; 325 TABLET ORAL at 16:00

## 2017-07-24 RX ADMIN — SODIUM CHLORIDE, POTASSIUM CHLORIDE, SODIUM LACTATE AND CALCIUM CHLORIDE: 600; 310; 30; 20 INJECTION, SOLUTION INTRAVENOUS at 15:01

## 2017-07-24 RX ADMIN — MIDAZOLAM HYDROCHLORIDE 1 MG: 1 INJECTION, SOLUTION INTRAMUSCULAR; INTRAVENOUS at 14:24

## 2017-07-24 RX ADMIN — OXYCODONE HYDROCHLORIDE AND ACETAMINOPHEN 1 TABLET: 7.5; 325 TABLET ORAL at 22:47

## 2017-07-24 RX ADMIN — PHENYLEPHRINE HYDROCHLORIDE 100 MCG: 10 INJECTION INTRAVENOUS at 14:54

## 2017-07-24 RX ADMIN — OXYTOCIN 30 UNITS: 10 INJECTION, SOLUTION INTRAMUSCULAR; INTRAVENOUS at 14:49

## 2017-07-24 RX ADMIN — FENTANYL CITRATE 25 MCG: 50 INJECTION, SOLUTION INTRAMUSCULAR; INTRAVENOUS at 14:56

## 2017-07-24 RX ADMIN — ONDANSETRON 4 MG: 2 INJECTION INTRAMUSCULAR; INTRAVENOUS at 11:51

## 2017-07-24 RX ADMIN — EPHEDRINE SULFATE 10 MG: 50 INJECTION INTRAMUSCULAR; INTRAVENOUS; SUBCUTANEOUS at 15:00

## 2017-07-24 RX ADMIN — PHENYLEPHRINE HYDROCHLORIDE 100 MCG: 10 INJECTION INTRAVENOUS at 15:00

## 2017-07-24 RX ADMIN — SODIUM CHLORIDE, POTASSIUM CHLORIDE, SODIUM LACTATE AND CALCIUM CHLORIDE 1000 ML/HR: 600; 310; 30; 20 INJECTION, SOLUTION INTRAVENOUS at 14:16

## 2017-07-24 RX ADMIN — OXYTOCIN 30 UNITS: 10 INJECTION, SOLUTION INTRAMUSCULAR; INTRAVENOUS at 15:01

## 2017-07-24 RX ADMIN — SIMETHICONE CHEW TAB 80 MG 80 MG: 80 TABLET ORAL at 22:44

## 2017-07-24 RX ADMIN — SODIUM CHLORIDE, POTASSIUM CHLORIDE, SODIUM LACTATE AND CALCIUM CHLORIDE 125 ML/HR: 600; 310; 30; 20 INJECTION, SOLUTION INTRAVENOUS at 11:45

## 2017-07-24 RX ADMIN — ONDANSETRON 4 MG: 2 INJECTION INTRAMUSCULAR; INTRAVENOUS at 14:23

## 2017-07-24 RX ADMIN — MIDAZOLAM HYDROCHLORIDE 1 MG: 1 INJECTION, SOLUTION INTRAMUSCULAR; INTRAVENOUS at 14:51

## 2017-07-24 RX ADMIN — SODIUM CHLORIDE, POTASSIUM CHLORIDE, SODIUM LACTATE AND CALCIUM CHLORIDE: 600; 310; 30; 20 INJECTION, SOLUTION INTRAVENOUS at 14:20

## 2017-07-24 RX ADMIN — PHENYLEPHRINE HYDROCHLORIDE 100 MCG: 10 INJECTION INTRAVENOUS at 14:56

## 2017-07-24 NOTE — ANESTHESIA POSTPROCEDURE EVALUATION
Patient: Jing Crouch    Procedure Summary     Date Anesthesia Start Anesthesia Stop Room / Location    17 1422 1514 [facility]   [facility]       Procedure Diagnosis Surgeon Provider     SECTION PRIMARY TWINS  (N/A Abdomen) No diagnosis on file. MD Greyson Peterson MD          Anesthesia Type: spinal, ITN  Last vitals  BP     130/76   Temp     98.1   Pulse    86   Resp     18   SpO2     97     Post Anesthesia Care and Evaluation    Patient location during evaluation: bedside  Patient participation: complete - patient participated  Level of consciousness: awake and alert  Pain management: adequate  Airway patency: patent  Anesthetic complications: No anesthetic complications    Cardiovascular status: acceptable  Respiratory status: acceptable  Hydration status: acceptable

## 2017-07-24 NOTE — ANESTHESIA PREPROCEDURE EVALUATION
Anesthesia Evaluation     Patient summary reviewed and Nursing notes reviewed   NPO Solid Status: > 8 hours  NPO Liquid Status: > 8 hours     Airway   Mallampati: III  TM distance: <3 FB  Dental      Pulmonary - negative pulmonary ROS   Cardiovascular - negative cardio ROS        Neuro/Psych- negative ROS  GI/Hepatic/Renal/Endo - negative ROS     Musculoskeletal (-) negative ROS    Abdominal    Substance History - negative use     OB/GYN    (+) Pregnant, Preeclampsia,         Other - negative ROS                                       Anesthesia Plan    ASA 3     spinal and ITN     Anesthetic plan and risks discussed with patient.

## 2017-07-24 NOTE — ANESTHESIA PROCEDURE NOTES
Spinal Block    Patient location during procedure: OR  Indication:at surgeon's request  Performed By  Anesthesiologist: CLAYTON DANIELS  CRNA: WALDO ALVARADO  Preanesthetic Checklist  Completed: patient identified, surgical consent, pre-op evaluation, timeout performed, IV checked, risks and benefits discussed and monitors and equipment checked  Spinal Block Prep:  Patient Position:sitting  Sterile Tech:cap, gloves, mask and sterile barriers  Prep:Betadine  Patient Monitoring:blood pressure monitoring, continuous pulse oximetry and EKG  Spinal Block Procedure  Approach:midline  Guidance:palpation technique  Location:L3-L4  Needle Type:Supa  Needle Gauge:25 G  Placement of Spinal needle event:cerebrospinal fluid aspirated  Paresthesia: no  Fluid Appearance:clear  Post Assessment  Patient Tolerance:patient tolerated the procedure well with no apparent complications  Complications no

## 2017-07-25 LAB
ALP SERPL-CCNC: 339 U/L (ref 25–100)
ALT SERPL W P-5'-P-CCNC: 47 U/L (ref 7–40)
AST SERPL-CCNC: 37 U/L (ref 0–33)
BASOPHILS # BLD AUTO: 0.02 10*3/MM3 (ref 0–0.2)
BASOPHILS NFR BLD AUTO: 0.1 % (ref 0–1)
BILIRUB SERPL-MCNC: 0.9 MG/DL (ref 0.3–1.2)
CREAT BLD-MCNC: 0.5 MG/DL (ref 0.6–1.3)
DEPRECATED RDW RBC AUTO: 45.6 FL (ref 37–54)
EOSINOPHIL # BLD AUTO: 0.05 10*3/MM3 (ref 0–0.3)
EOSINOPHIL NFR BLD AUTO: 0.4 % (ref 0–3)
ERYTHROCYTE [DISTWIDTH] IN BLOOD BY AUTOMATED COUNT: 13.9 % (ref 11.3–14.5)
HCT VFR BLD AUTO: 31.5 % (ref 34.5–44)
HGB BLD-MCNC: 10.1 G/DL (ref 11.5–15.5)
IMM GRANULOCYTES # BLD: 0.07 10*3/MM3 (ref 0–0.03)
IMM GRANULOCYTES NFR BLD: 0.5 % (ref 0–0.6)
LDH SERPL-CCNC: 325 U/L (ref 120–246)
LYMPHOCYTES # BLD AUTO: 2.18 10*3/MM3 (ref 0.6–4.8)
LYMPHOCYTES NFR BLD AUTO: 16.2 % (ref 24–44)
MCH RBC QN AUTO: 29.1 PG (ref 27–31)
MCHC RBC AUTO-ENTMCNC: 32.1 G/DL (ref 32–36)
MCV RBC AUTO: 90.8 FL (ref 80–99)
MONOCYTES # BLD AUTO: 1.02 10*3/MM3 (ref 0–1)
MONOCYTES NFR BLD AUTO: 7.6 % (ref 0–12)
NEUTROPHILS # BLD AUTO: 10.14 10*3/MM3 (ref 1.5–8.3)
NEUTROPHILS NFR BLD AUTO: 75.2 % (ref 41–71)
PLATELET # BLD AUTO: 134 10*3/MM3 (ref 150–450)
PMV BLD AUTO: 12.3 FL (ref 6–12)
RBC # BLD AUTO: 3.47 10*6/MM3 (ref 3.89–5.14)
URATE SERPL-MCNC: 4.5 MG/DL (ref 3.1–7.8)
WBC NRBC COR # BLD: 13.48 10*3/MM3 (ref 3.5–10.8)

## 2017-07-25 PROCEDURE — 83615 LACTATE (LD) (LDH) ENZYME: CPT | Performed by: OBSTETRICS & GYNECOLOGY

## 2017-07-25 PROCEDURE — 99231 SBSQ HOSP IP/OBS SF/LOW 25: CPT | Performed by: OBSTETRICS & GYNECOLOGY

## 2017-07-25 PROCEDURE — 85025 COMPLETE CBC W/AUTO DIFF WBC: CPT | Performed by: OBSTETRICS & GYNECOLOGY

## 2017-07-25 PROCEDURE — 82565 ASSAY OF CREATININE: CPT | Performed by: OBSTETRICS & GYNECOLOGY

## 2017-07-25 PROCEDURE — 84460 ALANINE AMINO (ALT) (SGPT): CPT | Performed by: OBSTETRICS & GYNECOLOGY

## 2017-07-25 PROCEDURE — 84450 TRANSFERASE (AST) (SGOT): CPT | Performed by: OBSTETRICS & GYNECOLOGY

## 2017-07-25 PROCEDURE — 82247 BILIRUBIN TOTAL: CPT | Performed by: OBSTETRICS & GYNECOLOGY

## 2017-07-25 PROCEDURE — 84075 ASSAY ALKALINE PHOSPHATASE: CPT | Performed by: OBSTETRICS & GYNECOLOGY

## 2017-07-25 PROCEDURE — 84550 ASSAY OF BLOOD/URIC ACID: CPT | Performed by: OBSTETRICS & GYNECOLOGY

## 2017-07-25 RX ADMIN — OXYCODONE HYDROCHLORIDE AND ACETAMINOPHEN 1 TABLET: 7.5; 325 TABLET ORAL at 17:06

## 2017-07-25 RX ADMIN — DOCUSATE SODIUM 100 MG: 100 CAPSULE, LIQUID FILLED ORAL at 10:34

## 2017-07-25 RX ADMIN — DOCUSATE SODIUM 100 MG: 100 CAPSULE, LIQUID FILLED ORAL at 17:06

## 2017-07-25 RX ADMIN — OXYCODONE HYDROCHLORIDE AND ACETAMINOPHEN 1 TABLET: 7.5; 325 TABLET ORAL at 03:50

## 2017-07-25 RX ADMIN — SIMETHICONE CHEW TAB 80 MG 80 MG: 80 TABLET ORAL at 10:34

## 2017-07-25 RX ADMIN — OXYCODONE AND ACETAMINOPHEN 1 TABLET: 5; 325 TABLET ORAL at 10:34

## 2017-07-25 RX ADMIN — SIMETHICONE CHEW TAB 80 MG 80 MG: 80 TABLET ORAL at 17:06

## 2017-07-25 RX ADMIN — SIMETHICONE CHEW TAB 80 MG 80 MG: 80 TABLET ORAL at 20:59

## 2017-07-25 RX ADMIN — OXYCODONE HYDROCHLORIDE AND ACETAMINOPHEN 1 TABLET: 7.5; 325 TABLET ORAL at 20:59

## 2017-07-25 NOTE — ANESTHESIA POSTPROCEDURE EVALUATION
Patient: Jing Crouch    Procedure Summary     Date Anesthesia Start Anesthesia Stop Room / Location    17 1422 1514 [facility]   [facility]       Procedure Diagnosis Surgeon Provider     SECTION PRIMARY TWINS  (N/A Abdomen) No diagnosis on file. MD Greyson Peterson MD          Anesthesia Type: spinal, ITN  Last vitals  BP        Temp        Pulse       Resp        SpO2          Post Anesthesia Care and Evaluation    Patient location during evaluation: bedside  Patient participation: complete - patient participated  Level of consciousness: awake and alert  Pain management: adequate  Airway patency: patent  Anesthetic complications: No anesthetic complications    Cardiovascular status: acceptable  Respiratory status: acceptable  Hydration status: acceptable  Post Neuraxial Block status: Motor and sensory function returned to baseline and No signs or symptoms of PDPH

## 2017-07-26 LAB
CYTO UR: NORMAL
LAB AP CASE REPORT: NORMAL
LAB AP CLINICAL INFORMATION: NORMAL
Lab: NORMAL
PATH REPORT.FINAL DX SPEC: NORMAL
PATH REPORT.GROSS SPEC: NORMAL

## 2017-07-26 PROCEDURE — 99231 SBSQ HOSP IP/OBS SF/LOW 25: CPT | Performed by: OBSTETRICS & GYNECOLOGY

## 2017-07-26 RX ADMIN — OXYCODONE HYDROCHLORIDE AND ACETAMINOPHEN 1 TABLET: 7.5; 325 TABLET ORAL at 13:39

## 2017-07-26 RX ADMIN — SIMETHICONE CHEW TAB 80 MG 80 MG: 80 TABLET ORAL at 09:21

## 2017-07-26 RX ADMIN — DOCUSATE SODIUM 100 MG: 100 CAPSULE, LIQUID FILLED ORAL at 09:21

## 2017-07-26 RX ADMIN — OXYCODONE HYDROCHLORIDE AND ACETAMINOPHEN 1 TABLET: 7.5; 325 TABLET ORAL at 09:21

## 2017-07-26 RX ADMIN — SIMETHICONE CHEW TAB 80 MG 80 MG: 80 TABLET ORAL at 13:39

## 2017-07-26 RX ADMIN — DOCUSATE SODIUM 100 MG: 100 CAPSULE, LIQUID FILLED ORAL at 17:35

## 2017-07-26 RX ADMIN — OXYCODONE HYDROCHLORIDE AND ACETAMINOPHEN 1 TABLET: 7.5; 325 TABLET ORAL at 01:17

## 2017-07-26 RX ADMIN — SIMETHICONE CHEW TAB 80 MG 80 MG: 80 TABLET ORAL at 17:35

## 2017-07-26 RX ADMIN — SIMETHICONE CHEW TAB 80 MG 80 MG: 80 TABLET ORAL at 21:29

## 2017-07-26 RX ADMIN — OXYCODONE HYDROCHLORIDE AND ACETAMINOPHEN 1 TABLET: 7.5; 325 TABLET ORAL at 21:29

## 2017-07-26 RX ADMIN — OXYCODONE HYDROCHLORIDE AND ACETAMINOPHEN 1 TABLET: 7.5; 325 TABLET ORAL at 17:35

## 2017-07-26 RX ADMIN — OXYCODONE HYDROCHLORIDE AND ACETAMINOPHEN 1 TABLET: 7.5; 325 TABLET ORAL at 05:17

## 2017-07-27 VITALS
OXYGEN SATURATION: 98 % | BODY MASS INDEX: 30.62 KG/M2 | HEART RATE: 74 BPM | TEMPERATURE: 98.4 F | HEIGHT: 59 IN | SYSTOLIC BLOOD PRESSURE: 106 MMHG | DIASTOLIC BLOOD PRESSURE: 55 MMHG | RESPIRATION RATE: 18 BRPM | WEIGHT: 151.9 LBS

## 2017-07-27 PROCEDURE — 99238 HOSP IP/OBS DSCHRG MGMT 30/<: CPT | Performed by: OBSTETRICS & GYNECOLOGY

## 2017-07-27 RX ORDER — PSEUDOEPHEDRINE HCL 30 MG
100 TABLET ORAL 2 TIMES DAILY PRN
Qty: 20 CAPSULE | Refills: 0 | Status: SHIPPED | OUTPATIENT
Start: 2017-07-27

## 2017-07-27 RX ORDER — OXYCODONE HYDROCHLORIDE AND ACETAMINOPHEN 5; 325 MG/1; MG/1
1 TABLET ORAL EVERY 4 HOURS PRN
Qty: 8 TABLET | Refills: 0 | Status: SHIPPED | OUTPATIENT
Start: 2017-07-27 | End: 2017-08-03

## 2017-07-27 RX ADMIN — OXYCODONE AND ACETAMINOPHEN 1 TABLET: 5; 325 TABLET ORAL at 01:29

## 2017-07-27 RX ADMIN — OXYCODONE AND ACETAMINOPHEN 1 TABLET: 5; 325 TABLET ORAL at 05:33

## 2017-08-01 ENCOUNTER — POSTPARTUM VISIT (OUTPATIENT)
Dept: OBSTETRICS AND GYNECOLOGY | Facility: HOSPITAL | Age: 22
End: 2017-08-01

## 2017-08-01 VITALS
WEIGHT: 139 LBS | RESPIRATION RATE: 20 BRPM | HEART RATE: 93 BPM | BODY MASS INDEX: 27.65 KG/M2 | DIASTOLIC BLOOD PRESSURE: 73 MMHG | SYSTOLIC BLOOD PRESSURE: 131 MMHG

## 2017-08-01 DIAGNOSIS — Z98.891 STATUS POST CESAREAN DELIVERY: Primary | ICD-10-CM

## 2017-08-01 PROCEDURE — 99212 OFFICE O/P EST SF 10 MIN: CPT | Performed by: OBSTETRICS & GYNECOLOGY

## 2017-08-01 RX ORDER — IBUPROFEN 200 MG
200 TABLET ORAL EVERY 6 HOURS PRN
COMMUNITY

## 2017-08-01 NOTE — PROGRESS NOTES
2 week PP  No c/o  Legs NT  Lochia scant    VSS AF  Incision healing well  Uterus NT    F/U 4 weeks with Dr. Brunner per patient request.

## 2019-08-12 ENCOUNTER — TRANSCRIBE ORDERS (OUTPATIENT)
Dept: OBSTETRICS AND GYNECOLOGY | Facility: HOSPITAL | Age: 24
End: 2019-08-12

## 2019-08-12 DIAGNOSIS — O09.891 MEDICATION EXPOSURE DURING FIRST TRIMESTER OF PREGNANCY: Primary | ICD-10-CM

## 2019-08-12 DIAGNOSIS — Z98.891 HISTORY OF C-SECTION: ICD-10-CM

## 2019-08-19 ENCOUNTER — HOSPITAL ENCOUNTER (OUTPATIENT)
Dept: WOMENS IMAGING | Facility: HOSPITAL | Age: 24
Discharge: HOME | End: 2019-08-19
Admitting: OBSTETRICS & GYNECOLOGY

## 2019-08-19 ENCOUNTER — OFFICE VISIT (OUTPATIENT)
Dept: OBSTETRICS AND GYNECOLOGY | Facility: HOSPITAL | Age: 24
End: 2019-08-19

## 2019-08-19 VITALS
DIASTOLIC BLOOD PRESSURE: 69 MMHG | BODY MASS INDEX: 26.35 KG/M2 | WEIGHT: 134.2 LBS | SYSTOLIC BLOOD PRESSURE: 127 MMHG | HEIGHT: 60 IN

## 2019-08-19 DIAGNOSIS — O09.891 MEDICATION EXPOSURE DURING FIRST TRIMESTER OF PREGNANCY: ICD-10-CM

## 2019-08-19 DIAGNOSIS — Z98.891 PREVIOUS CESAREAN SECTION: Primary | ICD-10-CM

## 2019-08-19 DIAGNOSIS — Z98.891 HISTORY OF C-SECTION: ICD-10-CM

## 2019-08-19 PROBLEM — O36.5990 IUGR (INTRAUTERINE GROWTH RESTRICTION) AFFECTING CARE OF MOTHER: Status: RESOLVED | Noted: 2017-06-22 | Resolved: 2019-08-19

## 2019-08-19 PROCEDURE — 76813 OB US NUCHAL MEAS 1 GEST: CPT | Performed by: OBSTETRICS & GYNECOLOGY

## 2019-08-19 PROCEDURE — 99241 PR OFFICE CONSULTATION NEW/ESTAB PATIENT 15 MIN: CPT | Performed by: OBSTETRICS & GYNECOLOGY

## 2019-08-19 PROCEDURE — 76813 OB US NUCHAL MEAS 1 GEST: CPT

## 2019-08-19 RX ORDER — VENLAFAXINE 37.5 MG/1
37.5 TABLET ORAL DAILY
COMMUNITY

## 2019-08-19 NOTE — PROGRESS NOTES
Documentation of the ultasound findings, images, and interpretations as well as consultation note will be available in the patient's Viewpoint report located in the Chart Review Imaging tab in [facility].

## 2019-09-30 ENCOUNTER — OFFICE VISIT (OUTPATIENT)
Dept: OBSTETRICS AND GYNECOLOGY | Facility: HOSPITAL | Age: 24
End: 2019-09-30

## 2019-09-30 ENCOUNTER — HOSPITAL ENCOUNTER (OUTPATIENT)
Dept: WOMENS IMAGING | Facility: HOSPITAL | Age: 24
Discharge: HOME | End: 2019-09-30
Admitting: OBSTETRICS & GYNECOLOGY

## 2019-09-30 VITALS — SYSTOLIC BLOOD PRESSURE: 104 MMHG | BODY MASS INDEX: 27.72 KG/M2 | WEIGHT: 139.6 LBS | DIASTOLIC BLOOD PRESSURE: 65 MMHG

## 2019-09-30 DIAGNOSIS — O34.219 PREVIOUS CESAREAN DELIVERY AFFECTING PREGNANCY: ICD-10-CM

## 2019-09-30 DIAGNOSIS — O35.BXX0 VENTRICULAR SEPTAL DEFECT (VSD) OF FETUS IN SINGLETON PREGNANCY, ANTEPARTUM: ICD-10-CM

## 2019-09-30 DIAGNOSIS — O35.9XX0 TERATOGEN EXPOSURE IN CURRENT PREGNANCY, SINGLE OR UNSPECIFIED FETUS: Primary | ICD-10-CM

## 2019-09-30 DIAGNOSIS — O09.891 MEDICATION EXPOSURE DURING FIRST TRIMESTER OF PREGNANCY: ICD-10-CM

## 2019-09-30 DIAGNOSIS — Z98.891 PREVIOUS CESAREAN SECTION: ICD-10-CM

## 2019-09-30 DIAGNOSIS — Z34.90 PREGNANCY, UNSPECIFIED GESTATIONAL AGE: ICD-10-CM

## 2019-09-30 PROCEDURE — 76811 OB US DETAILED SNGL FETUS: CPT

## 2019-09-30 PROCEDURE — 76811 OB US DETAILED SNGL FETUS: CPT | Performed by: OBSTETRICS & GYNECOLOGY

## 2019-12-30 ENCOUNTER — OFFICE VISIT (OUTPATIENT)
Dept: OBSTETRICS AND GYNECOLOGY | Facility: HOSPITAL | Age: 24
End: 2019-12-30

## 2019-12-30 ENCOUNTER — HOSPITAL ENCOUNTER (OUTPATIENT)
Dept: WOMENS IMAGING | Facility: HOSPITAL | Age: 24
Discharge: HOME | End: 2019-12-30
Admitting: OBSTETRICS & GYNECOLOGY

## 2019-12-30 VITALS — DIASTOLIC BLOOD PRESSURE: 67 MMHG | BODY MASS INDEX: 31.3 KG/M2 | SYSTOLIC BLOOD PRESSURE: 110 MMHG | WEIGHT: 157.6 LBS

## 2019-12-30 DIAGNOSIS — Z34.90 PREGNANCY, UNSPECIFIED GESTATIONAL AGE: ICD-10-CM

## 2019-12-30 DIAGNOSIS — O35.BXX0 VENTRICULAR SEPTAL DEFECT (VSD) OF FETUS IN SINGLETON PREGNANCY, ANTEPARTUM: ICD-10-CM

## 2019-12-30 DIAGNOSIS — O34.219 PREVIOUS CESAREAN DELIVERY AFFECTING PREGNANCY: Primary | ICD-10-CM

## 2019-12-30 DIAGNOSIS — O35.9XX0 TERATOGEN EXPOSURE IN CURRENT PREGNANCY, SINGLE OR UNSPECIFIED FETUS: ICD-10-CM

## 2019-12-30 DIAGNOSIS — O34.219 PREVIOUS CESAREAN DELIVERY AFFECTING PREGNANCY: ICD-10-CM

## 2019-12-30 PROCEDURE — 76816 OB US FOLLOW-UP PER FETUS: CPT | Performed by: OBSTETRICS & GYNECOLOGY

## 2019-12-30 PROCEDURE — 76816 OB US FOLLOW-UP PER FETUS: CPT

## 2019-12-30 NOTE — PROGRESS NOTES
Documentation of the ultasound findings, images, and interpretations with be available in the patient's Viewpoint report located in the Chart Review Imaging tab in [facility].

## (undated) DEVICE — SOL IRR H2O BTL 1000ML STRL

## (undated) DEVICE — 39" SINGLE PATIENT USE HOVERMATT: Brand: SINGLE PATIENT USE HOVERMATT

## (undated) DEVICE — SUT GUT CHRM 1 CTX 36IN 905H

## (undated) DEVICE — SOL IRR NACL 0.9PCT BT 1000ML

## (undated) DEVICE — GLV SURG EUDERMIC PF LTX 8 STRL

## (undated) DEVICE — PROXIMATE RH ROTATING HEAD SKIN STAPLERS (35 WIDE) CONTAINS 35 STAINLESS STEEL STAPLES: Brand: PROXIMATE

## (undated) DEVICE — COATED VICRYL  (POLYGLACTIN 910) SUTURE, VIOLET BRAIDED, STERILE, SYNTHETIC ABSORBABLE SUTURE: Brand: COATED VICRYL

## (undated) DEVICE — PK C/SECT 10

## (undated) DEVICE — SUT GUT CHRM 2/0 CT1 27IN 811H

## (undated) DEVICE — TRY SPINE BLCK WHITACRE 25G 3X5IN